# Patient Record
Sex: FEMALE | Race: WHITE | NOT HISPANIC OR LATINO | Employment: UNEMPLOYED | URBAN - METROPOLITAN AREA
[De-identification: names, ages, dates, MRNs, and addresses within clinical notes are randomized per-mention and may not be internally consistent; named-entity substitution may affect disease eponyms.]

---

## 2020-04-06 ENCOUNTER — OFFICE VISIT (OUTPATIENT)
Dept: FAMILY MEDICINE | Facility: CLINIC | Age: 25
End: 2020-04-06

## 2020-04-06 VITALS
SYSTOLIC BLOOD PRESSURE: 114 MMHG | DIASTOLIC BLOOD PRESSURE: 72 MMHG | WEIGHT: 112.4 LBS | RESPIRATION RATE: 16 BRPM | HEIGHT: 66 IN | BODY MASS INDEX: 18.06 KG/M2

## 2020-04-06 DIAGNOSIS — R63.4 WEIGHT LOSS: ICD-10-CM

## 2020-04-06 DIAGNOSIS — Z00.00 ROUTINE GENERAL MEDICAL EXAMINATION AT A HEALTH CARE FACILITY: Primary | ICD-10-CM

## 2020-04-06 DIAGNOSIS — Z13.0 SCREENING FOR DEFICIENCY ANEMIA: ICD-10-CM

## 2020-04-06 DIAGNOSIS — Z13.228 SCREENING FOR METABOLIC DISORDER: ICD-10-CM

## 2020-04-06 LAB
% GRANULOCYTES: 65.6 % (ref 42.2–75.2)
HCT VFR BLD AUTO: 39.5 % (ref 35–46)
HEMOGLOBIN: 13.5 G/DL (ref 11.8–15.5)
LYMPHOCYTES NFR BLD AUTO: 27 % (ref 20.5–51.1)
MCH RBC QN AUTO: 28.6 PG (ref 27–31)
MCHC RBC AUTO-ENTMCNC: 34.2 G/DL (ref 33–37)
MCV RBC AUTO: 83.4 FL (ref 80–100)
MONOCYTES NFR BLD AUTO: 7.4 % (ref 1.7–9.3)
PLATELET # BLD AUTO: 308 K/UL (ref 140–450)
RBC # BLD AUTO: 4.73 X10/CMM (ref 3.7–5.2)
WBC # BLD AUTO: 5 X10/CMM (ref 3.8–11)

## 2020-04-06 PROCEDURE — 84443 ASSAY THYROID STIM HORMONE: CPT | Mod: 90 | Performed by: NURSE PRACTITIONER

## 2020-04-06 PROCEDURE — 85025 COMPLETE CBC W/AUTO DIFF WBC: CPT | Performed by: NURSE PRACTITIONER

## 2020-04-06 PROCEDURE — 36415 COLL VENOUS BLD VENIPUNCTURE: CPT | Performed by: NURSE PRACTITIONER

## 2020-04-06 PROCEDURE — 80053 COMPREHEN METABOLIC PANEL: CPT | Mod: 90 | Performed by: NURSE PRACTITIONER

## 2020-04-06 PROCEDURE — 83036 HEMOGLOBIN GLYCOSYLATED A1C: CPT | Mod: 90 | Performed by: NURSE PRACTITIONER

## 2020-04-06 PROCEDURE — 80061 LIPID PANEL: CPT | Mod: 90 | Performed by: NURSE PRACTITIONER

## 2020-04-06 PROCEDURE — 90714 TD VACC NO PRESV 7 YRS+ IM: CPT | Performed by: NURSE PRACTITIONER

## 2020-04-06 PROCEDURE — 90651 9VHPV VACCINE 2/3 DOSE IM: CPT | Performed by: NURSE PRACTITIONER

## 2020-04-06 PROCEDURE — 90472 IMMUNIZATION ADMIN EACH ADD: CPT | Performed by: NURSE PRACTITIONER

## 2020-04-06 PROCEDURE — 99395 PREV VISIT EST AGE 18-39: CPT | Mod: 25 | Performed by: NURSE PRACTITIONER

## 2020-04-06 PROCEDURE — 90471 IMMUNIZATION ADMIN: CPT | Performed by: NURSE PRACTITIONER

## 2020-04-06 ASSESSMENT — ANXIETY QUESTIONNAIRES
GAD7 TOTAL SCORE: 7
1. FEELING NERVOUS, ANXIOUS, OR ON EDGE: SEVERAL DAYS
3. WORRYING TOO MUCH ABOUT DIFFERENT THINGS: MORE THAN HALF THE DAYS
2. NOT BEING ABLE TO STOP OR CONTROL WORRYING: MORE THAN HALF THE DAYS
5. BEING SO RESTLESS THAT IT IS HARD TO SIT STILL: SEVERAL DAYS
IF YOU CHECKED OFF ANY PROBLEMS ON THIS QUESTIONNAIRE, HOW DIFFICULT HAVE THESE PROBLEMS MADE IT FOR YOU TO DO YOUR WORK, TAKE CARE OF THINGS AT HOME, OR GET ALONG WITH OTHER PEOPLE: SOMEWHAT DIFFICULT
6. BECOMING EASILY ANNOYED OR IRRITABLE: SEVERAL DAYS
7. FEELING AFRAID AS IF SOMETHING AWFUL MIGHT HAPPEN: NOT AT ALL

## 2020-04-06 ASSESSMENT — MIFFLIN-ST. JEOR: SCORE: 1286.59

## 2020-04-06 ASSESSMENT — PATIENT HEALTH QUESTIONNAIRE - PHQ9
5. POOR APPETITE OR OVEREATING: NOT AT ALL
SUM OF ALL RESPONSES TO PHQ QUESTIONS 1-9: 6

## 2020-04-06 NOTE — PATIENT INSTRUCTIONS
Preventive Health Recommendations  Female Ages 21 to 25     Yearly exam:     See your health care provider every year in order to  o Review health changes.   o Discuss preventive care.    o Review your medicines if your doctor has prescribed any.      You should be tested each year for STDs (sexually transmitted diseases).       Talk to your provider about how often you should have cholesterol testing.      Get a Pap test every three years. If you have an abnormal result, your doctor may have you test more often.      If you are at risk for diabetes, you should have a diabetes test (fasting glucose).     Shots:     Get a flu shot each year.     Get a tetanus shot every 10 years.     Consider getting the shot (vaccine) that prevents cervical cancer (Gardasil).    Nutrition:     Eat at least 5 servings of fruits and vegetables each day.    Eat whole-grain bread, whole-wheat pasta and brown rice instead of white grains and rice.    Get adequate Calcium and Vitamin D.     Lifestyle    Exercise at least 150 minutes a week each week (30 minutes a day, 5 days a week). This will help you control your weight and prevent disease.    Limit alcohol to one drink per day.    No smoking.     Wear sunscreen to prevent skin cancer.    See your dentist every six months for an exam and cleaning.      Please get outside and exercise, plan time with family and friends, get some kind of game and play it with your roommates, see if anyone wants to do living-room yoga with you. Most importly talk to your friends and family about how you are feeling.     Increase calories  Patient Education   Tips to Increase the Calories in Your Diet  You may need more calories in your diet to help you heal from an illness, surgery or wound. Extra calories can also help you gain weight. Here are some ideas for adding high-calorie foods to your meals.  Butter, margarine and oil    Add to cream soups, sauces and gravies.    Use in hot cereals, rice,  noodles, potatoes and cooked vegetables.    Mix with herbs and seasonings, then add to cooked meat, hamburger, fish and egg dishes.    Melt and use as a dip for breads and seafood (shrimp, scallops, crab and lobster).    Spread butter or margarine on bread, then add sandwich spread or peanut butter.  Whipped cream, half-and-half and whole milk    Mix into cereals, mashed potatoes, pasta, rice and egg dishes.    Use to make sauces, cream soups, batters, puddings, custards, milk shakes and hot chocolate (with marshmallows).    Use sweetened whipped cream on hot chocolate, gelatin desserts, fruits, puddings, pancakes and waffles.    Mix unsweetened whipped cream with mashed potatoes or vegetable purees.  Cream cheese    Spread on muffins, bagels, breads, crackers and fruit slices.    Roll into balls and coat with granola, wheat germ or chopped nuts.    Mix with vegetables.  Sour cream    Use as a topping for baked potatoes, muffins, cakes, fruits, breads and gelatin desserts.    Add to soups, chili, macaroni and cheese, potatoes and vegetables.    Use as a dip for fresh fruits and vegetables.  Salad dressings and mayonnaise    Add to fish, meats, vegetable salads, egg salads and baked potatoes.    Spread on crackers or sandwiches.    Use as a dip for vegetables, pizza crusts, breads and chicken fingers.  Honey, jam, syrup and sugar    Add to breads, cereals, milk drinks, ice cream, yogurt and fruit desserts.    Use as a glaze for meats.  Granola and wheat germ    Mix with dried fruits and nuts for a snack.    Add to vegetables, yogurt, ice cream, fruits, cereals, custards and puddings.    Use in pudding recipes in place of rice or bread.    Use in breads, muffins and cookie batter.  Dried fruits  Note: Avoid in children under age 3.    Bake in turnovers and pies.    Mix with granola or nuts for a snack.    Add to stuffing, cookies, muffins, breads, cakes, rice and grain dishes, puddings and cereals.    Combine with  cooked vegetables such as yams, sweet potatoes, carrots and squash.  Nuts and seeds  Note: Avoid in children under age 3.    Use in casseroles, breads, muffins, pancakes, cookies and waffles.    Sprinkle on fruits, cereals, ice cream, yogurt, vegetables and salads.    Mix with raisins, dried fruits and chocolate chips for a snack.  Children under age 3 should avoid seeds, nuts, nut butters and hard pieces of fruit.  Nut butters  Note: Avoid in children under age 3.    Spread on sandwiches, toast, muffins, crackers, waffles, pancakes and fruit slices.    Use as a dip for raw vegetables.    Blend with milk shakes and nutrition drinks.    Swirl through ice cream or yogurt.    Mix into hot cereals.  Ice cream and frozen yogurt    Add to carbonated (fizzy) drinks such as ginger ale or root beer.    Blend with milk drinks for a shake or fruits for a smoothie.    Add to cereals, fruits, gelatin desserts and pies.    [place] between cookies, gina crackers or cake slices.    Scoop on top of waffles or pancakes.  Nutrition supplements (nutrition bars, drinks and powders)    Add powders to milk drinks and desserts.    Mix with ice cream, milk and fruit for a high-protein milk shake.    For informational purposes only. Not to replace the advice of your health care provider.  Copyright   2005 [facility]. All rights reserved. [employer] 616007 - REV 01/16.

## 2020-04-06 NOTE — PROGRESS NOTES
"   SUBJECTIVE:   CC: Christa Caceres is an 24 year old woman who presents for preventive health visit.     Reports a weight loss of 15-20 pounds, despite eating the usual amount of calories. Not exercising, reports she did not notice the weight loss until her family mentioned it over joyce. Family history (materanl aunt) of type one diabetes.    Usually eats pizza, cheese and crackers and pepperoni, oatmeal, eggs, burritos, chips, soup.   Wakes up feeling sick nausea without vomiting. This happens a few times a month. Occasionally feels faint, no fainting.     Started a new job and has been feeling depressed isolated and anxious.     Right foot feels \"weird\" and tingly, no \"pins and needels\" but \"like a pinched nerve\".     Cycle is 28-30 days lasts for 6-7 day sometimes very heavy bleeding. A tampon every hour occasionally. LMP: 03/10/2020. No recent changes to cycle. Has never been sexually active.     Healthy Habits:    Do you get at least three servings of calcium containing foods daily (dairy, green leafy vegetables, etc.)? yes    Amount of exercise or daily activities, outside of work: 1 day(s) per week    Problems taking medications regularly not applicable    Medication side effects: No    Have you had an eye exam in the past two years? no    Do you see a dentist twice per year? no    Do you have sleep apnea, excessive snoring or daytime drowsiness?no    Today's PHQ-2 Score:   PHQ-2 ( 1999 Pfizer) 4/6/2020   Q1: Little interest or pleasure in doing things 0   Q2: Feeling down, depressed or hopeless 1   PHQ-2 Score 1     Abuse: Current or Past(Physical, Sexual or Emotional)- No  Do you feel safe in your environment? Yes    Social History     Tobacco Use     Smoking status: Never Smoker     Smokeless tobacco: Never Used   Substance Use Topics     Alcohol use: No     If you drink alcohol do you typically have >3 drinks per day or >7 drinks per week? No                     Reviewed orders with patient.  " "Reviewed health maintenance and updated orders accordingly - Yes  Lab work is in process  Labs reviewed in EPIC  BP Readings from Last 3 Encounters:   04/06/20 114/72   08/14/13 102/68 (21 %/ 57 %)*   08/10/12 (!) 86/56 (<1 %/ 15 %)*     *BP percentiles are based on the 2017 AAP Clinical Practice Guideline for girls    Wt Readings from Last 3 Encounters:   04/06/20 51 kg (112 lb 6.4 oz)   08/14/13 53.6 kg (118 lb 3.2 oz) (48 %)*   08/10/12 52.4 kg (115 lb 9.6 oz) (51 %)*     * Growth percentiles are based on [facility][note 3] (Girls, 2-20 Years) data.          Mammogram not appropriate for this patient based on age.    Pertinent mammograms are reviewed under the imaging tab.  History of abnormal Pap smear: Not sexually active.      Reviewed and updated as needed this visit by clinical staff  Tobacco  Allergies  Meds       Reviewed and updated as needed this visit by Provider        ROS:  CONSTITUTIONAL: NEGATIVE for fever, chills, change in weight  INTEGUMENTARY/SKIN: NEGATIVE for worrisome rashes, moles or lesions  EYES: NEGATIVE for vision changes or irritation  ENT: NEGATIVE for ear, mouth and throat problems  RESP: NEGATIVE for significant cough or SOB  BREAST: NEGATIVE for masses, tenderness or discharge  CV: NEGATIVE for chest pain, palpitations or peripheral edema  GI: NEGATIVE for nausea, abdominal pain, heartburn, or change in bowel habits  : NEGATIVE for unusual urinary or vaginal symptoms. No vaginal bleeding.  MUSCULOSKELETAL: NEGATIVE for significant arthralgias or myalgia  NEURO: NEGATIVE for weakness, dizziness or paresthesias  ENDOCRINE: NEGATIVE for temperature intolerance, skin/hair changes  HEME/ALLERGY/IMMUNE: NEGATIVE for bleeding problems  PSYCHIATRIC: NEGATIVE for changes in mood or affect     OBJECTIVE:   /72   Resp 16   Ht 1.676 m (5' 6\")   Wt 51 kg (112 lb 6.4 oz)   LMP 03/10/2020   BMI 18.14 kg/m    EXAM:  GENERAL: healthy, alert and no distress  EYES: Eyes grossly normal to inspection, " PERRL and conjunctivae and sclerae normal  HENT: ear canals and TM's normal, nose and mouth without ulcers or lesions  NECK: no adenopathy, no asymmetry, masses, or scars and thyroid normal to palpation  RESP: lungs clear to auscultation - no rales, rhonchi or wheezes  BREAST: normal without masses, tenderness or nipple discharge and no palpable axillary masses or adenopathy  CV: regular rate and rhythm, normal S1 S2, no S3 or S4, no murmur, click or rub, no peripheral edema and peripheral pulses strong  ABDOMEN: soft, nontender, no hepatosplenomegaly, no masses and bowel sounds normal  MS: no gross musculoskeletal defects noted, no edema  SKIN: no suspicious lesions or rashes  NEURO: Normal strength and tone, mentation intact and speech normal  PSYCH: mentation appears normal, affect normal/bright    Diagnostic Test Results:  Labs reviewed in Epic    ASSESSMENT/PLAN:   Christa was seen today for physical, weight loss and nausea.    Diagnoses and all orders for this visit:    Routine general medical examination at a health care facility    Weight loss  -     Lipid Panel ([facility])  -     Comp. Metabolic Panel (14) ([facility])  -     Hemoglobin A1C ([facility])  I would like you to increase calories. I have given you written instruction on ways to increase your calorie intake daily. Please increase activity by walking or doing yoga in your apartment.     I would like you to continue to journal about your mood and we will check in four weeks from now. If you are still feeling depressed I would like you to consider taking an antidepressant to help improve your mood.     Screening for metabolic disorder  -     Comp. Metabolic Panel (14) ([facility])  -     Hemoglobin A1C ([facility])  -     TSH ([facility])  We have taken labs which will tell us how your blood sugar levels have been. I will call with any abnormal labs. I would like you to follow up here in four weeks. I am concerned your unintended weight loss.    Screening for  "deficiency anemia  -     CBC with Diff/Plt ([facility])  I would like to screen for anemia based on your report of increased heavy menstrual bleeding. I will call if any of your lab tests are abnormal.     Other orders  -     TD PRESERV FREE, IM (7+ YRS)  -     ADMIN 1st VACCINE  -     HPV, IM (9 - 26 YRS) - Gardasil 9  -     EA ADD'L VACCINE        COUNSELING:   Reviewed preventive health counseling, as reflected in patient instructions  Special attention given to:        Regular exercise       Healthy diet/nutrition       Vision screening    Estimated body mass index is 18.14 kg/m  as calculated from the following:    Height as of this encounter: 1.676 m (5' 6\").    Weight as of this encounter: 51 kg (112 lb 6.4 oz).    Weight management plan return visit in 1 Month     reports that she has never smoked. She has never used smokeless tobacco.    Counseling Resources:  ATP IV Guidelines  Pooled Cohorts Equation Calculator  Breast Cancer Risk Calculator  FRAX Risk Assessment  ICSI Preventive Guidelines  Dietary Guidelines for Americans, 2010  USDA's MyPlate  ASA Prophylaxis  Lung CA Screening    Dasia Buckley NP  [facility]  "

## 2020-04-07 LAB
ALBUMIN SERPL-MCNC: 4.3 G/DL (ref 3.9–5)
ALBUMIN/GLOB SERPL: 1.9 {RATIO} (ref 1.2–2.2)
ALP SERPL-CCNC: 53 IU/L (ref 39–117)
ALT SERPL-CCNC: 12 IU/L (ref 0–32)
AST SERPL-CCNC: 12 IU/L (ref 0–40)
BILIRUB SERPL-MCNC: 0.3 MG/DL (ref 0–1.2)
BUN SERPL-MCNC: 8 MG/DL (ref 6–20)
BUN/CREATININE RATIO: 12 (ref 9–23)
CALCIUM SERPL-MCNC: 9.5 MG/DL (ref 8.7–10.2)
CHLORIDE SERPLBLD-SCNC: 105 MMOL/L (ref 96–106)
CHOLEST SERPL-MCNC: 149 MG/DL (ref 100–199)
CREAT SERPL-MCNC: 0.67 MG/DL (ref 0.57–1)
EGFR IF AFRICN AM: 144 ML/MIN/1.73
EGFR IF NONAFRICN AM: 125 ML/MIN/1.73
GLOBULIN, TOTAL: 2.3 G/DL (ref 1.5–4.5)
GLUCOSE SERPL-MCNC: 85 MG/DL (ref 65–99)
HBA1C MFR BLD: 5.6 % (ref 4.8–5.6)
HDLC SERPL-MCNC: 67 MG/DL
LDL/HDL RATIO: 1.1 RATIO (ref 0–3.2)
LDLC SERPL CALC-MCNC: 71 MG/DL (ref 0–99)
POTASSIUM SERPL-SCNC: 4.2 MMOL/L (ref 3.5–5.2)
PROT SERPL-MCNC: 6.6 G/DL (ref 6–8.5)
SODIUM SERPL-SCNC: 140 MMOL/L (ref 134–144)
TOTAL CO2: 26 MMOL/L (ref 20–29)
TRIGL SERPL-MCNC: 56 MG/DL (ref 0–149)
TSH BLD-ACNC: 1.93 UIU/ML (ref 0.45–4.5)
VLDLC SERPL CALC-MCNC: 11 MG/DL (ref 5–40)

## 2020-04-07 ASSESSMENT — ANXIETY QUESTIONNAIRES: GAD7 TOTAL SCORE: 7

## 2020-05-04 ENCOUNTER — OFFICE VISIT (OUTPATIENT)
Dept: FAMILY MEDICINE | Facility: CLINIC | Age: 25
End: 2020-05-04

## 2020-05-04 VITALS
OXYGEN SATURATION: 98 % | BODY MASS INDEX: 18.21 KG/M2 | SYSTOLIC BLOOD PRESSURE: 110 MMHG | DIASTOLIC BLOOD PRESSURE: 70 MMHG | TEMPERATURE: 98.6 F | WEIGHT: 112.8 LBS | HEART RATE: 68 BPM | RESPIRATION RATE: 16 BRPM

## 2020-05-04 DIAGNOSIS — R63.4 UNINTENDED WEIGHT LOSS: Primary | ICD-10-CM

## 2020-05-04 LAB — ERYTHROCYTE [SEDIMENTATION RATE] IN BLOOD: 0 MM/HR (ref 0–20)

## 2020-05-04 PROCEDURE — 99213 OFFICE O/P EST LOW 20 MIN: CPT | Performed by: NURSE PRACTITIONER

## 2020-05-04 PROCEDURE — 82306 VITAMIN D 25 HYDROXY: CPT | Mod: 90 | Performed by: NURSE PRACTITIONER

## 2020-05-04 PROCEDURE — 86140 C-REACTIVE PROTEIN: CPT | Mod: 90 | Performed by: NURSE PRACTITIONER

## 2020-05-04 PROCEDURE — 36415 COLL VENOUS BLD VENIPUNCTURE: CPT | Performed by: NURSE PRACTITIONER

## 2020-05-04 PROCEDURE — 83516 IMMUNOASSAY NONANTIBODY: CPT | Mod: 90 | Performed by: NURSE PRACTITIONER

## 2020-05-04 PROCEDURE — 85651 RBC SED RATE NONAUTOMATED: CPT | Performed by: NURSE PRACTITIONER

## 2020-05-04 ASSESSMENT — ANXIETY QUESTIONNAIRES
7. FEELING AFRAID AS IF SOMETHING AWFUL MIGHT HAPPEN: NOT AT ALL
5. BEING SO RESTLESS THAT IT IS HARD TO SIT STILL: SEVERAL DAYS
6. BECOMING EASILY ANNOYED OR IRRITABLE: SEVERAL DAYS
2. NOT BEING ABLE TO STOP OR CONTROL WORRYING: SEVERAL DAYS
IF YOU CHECKED OFF ANY PROBLEMS ON THIS QUESTIONNAIRE, HOW DIFFICULT HAVE THESE PROBLEMS MADE IT FOR YOU TO DO YOUR WORK, TAKE CARE OF THINGS AT HOME, OR GET ALONG WITH OTHER PEOPLE: SOMEWHAT DIFFICULT
1. FEELING NERVOUS, ANXIOUS, OR ON EDGE: SEVERAL DAYS
3. WORRYING TOO MUCH ABOUT DIFFERENT THINGS: SEVERAL DAYS
GAD7 TOTAL SCORE: 5

## 2020-05-04 ASSESSMENT — PATIENT HEALTH QUESTIONNAIRE - PHQ9
SUM OF ALL RESPONSES TO PHQ QUESTIONS 1-9: 5
5. POOR APPETITE OR OVEREATING: NOT AT ALL

## 2020-05-04 NOTE — PATIENT INSTRUCTIONS
Labs today, stool testing for H pylori  Start seeing your therapist and let us know how this goes  Keep track of your calories  Exercise should help with your well being as well, continue this!    Recheck in 6-8 weeks with Taina or Dasia Nguyen at some point this year when you are ready!

## 2020-05-04 NOTE — PROGRESS NOTES
Problem(s) Oriented visit        SUBJECTIVE:                                                    Christa Caceres is a 24 year old female who presents to clinic today for the following health issues:    Unintended weight loss follow up: 5/2019 weighed 130lb, 5lb lighter at the end of the summer, 5lb lighter at [place], and now 5lb lighter since then. Was seen about a month ago and weight was 51kg, now 51.2kg. Has been monitoring her caloric intake and 24h diet recall is as follows:   24 hour diet:  Coffee and two small bran muffins with butter  Egg and a piece of toast, water, Jona   Goldfish crackers handful  Oatmeal chocolate chip cookie and glass of milk  Burrito and a whole avocado, chips, sprite  Water throughout the day    She is unsure why she has been losing weight. States that she feels comfortable with her body and weight, adamantly denies intentionally restricting calories, increasing her exercise (though she does exercise), or intentionally vomiting after eating. Does note that she has to gag and vomits after eating about once per month, but also admits that this can be brought on by emotional stressors as well. She can pinpoint when this all started- was on a road trip to [place] last spring with her friends and had an emotionally difficult conversation with one of her friends. She is unsure if this was due to eating something strange on the road trip or the conversation.    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 She saw Dasia Buckley CNP, last month and discussed possibly being depressed over the winter. She will be seeing a therapist starting this week. No SI/HI. No smoking or illicit drugs, drinks about one drink per week.     Denies fevers, chills, lymphadenopathy, SOB/MENCHACA, cough, dysphagia, chest pain, abdominal pain, heartburn, polyuria, pelvic pain, problems with bowels, feeling excessively hot or  cold, weakness, muscle pain, hair loss, joint pain, abnormal rashes. Continues with occasionally heavy periods but periods are regular. She reports her mother is more concerned about this than she is and her mother is concerned about lupus.      Problem list, Medication list, Allergies, and Medical/Social/Surgical histories reviewed in [facility] and updated as appropriate.   Additional history: as documented    ROS:  10 point ROS completed and negative except noted above, including Gen, CV, Resp, GI, , MS, Endo, Neurologic, Psych    Histories:   There is no problem list on file for this patient.    No past surgical history on file.    Social History     Tobacco Use     Smoking status: Never Smoker     Smokeless tobacco: Never Used   Substance Use Topics     Alcohol use: No     No family history on file.        OBJECTIVE:                                                    Physical Exam:    /70   Pulse 68   Temp 98.6  F (37  C)   Resp 16   Wt 51.2 kg (112 lb 12.8 oz)   SpO2 98%   BMI 18.21 kg/m      CONSTITUTIONAL: Alert non-toxic appearing female in no acute distress- thin but not cachetic  HEAD: Normocephalic, atraumatic  EYES: PERRLA; no scleral icterus; sclera without injection  NECK: Neck supple without lymphadenopathy, thyroid without enlargement or nodularity  RESPIRATORY: Lungs clear to auscultation, respirations unlabored  CV: Regular rate and rhythm, S1S2, no clicks, murmurs, rubs, or gallops; bilateral lower extremities without edema, dorsalis pedis pulses 2+ bilaterally  GASTROINTESTINAL: Normoactive bowel sounds x4 quadrants, abdomen soft, non-distended, and non-tender to palpation without masses or organomegaly  LYMPHATIC: Cervical, supraclavicular, and inguinal nodes without lymphadenopathy  MUSCULOSKELETAL: Moves all extremities, no obvious muscle wasting  NEUROLOGIC: No gross deficits, normal gait  SKIN: Appropriate color for race, warm and dry, no rashes or lesions to unclothed  skin  PSYCHIATRIC: Pleasant and interactive, affect bright, makes appropriate eye contact, thought process linear       ASSESSMENT/PLAN:                                                        Christa was seen today for follow up.    Diagnoses and all orders for this visit:    Unintended weight loss  -     t-Transglutaminase (tTG) IgA ([facility])  -     Cancel: H. Pylori Stool Ag EIA ([facility])  -     Vitamin D  25-Hydroxy ([facility])  -     Sed Rate Westergren ([facility])  -     C-Reactive Protein  Quant ([facility])  -     H. Pylori Stool Ag EIA ([facility]); Future      Weight has been stable the past month, commended on this. Encouraged her to continue to keep a log of her intake to ensure adequate calories. Given the occasional vomiting after eating that began while on a trip and sharing food with her friends, will obtain H Pylori testing. Given vomiting with weight loss, will obtain tTg to eval for Celiac. Discussed that lupus is unlikely given her symptoms and her mother's concern, but will obtain inflammatory markers and if abnormal, will refer to rheumatology. Commended on making appointment with a therapist and encouraged her to keep this appointment- we discussed that there could be a trauma component related to this as it did start when she was having a difficult conversation with her friend.    Due for a Pap given that she is 23- she does not feel prepared for this today, to return for Pap. Follow up in 6-8 weeks with Taina or Dasia.    INSTRUCTIONS:  Labs today, stool testing for H pylori  Start seeing your therapist and let us know how this goes  Keep track of your calories  Exercise should help with your well being as well, continue this!    Recheck in 6-8 weeks with Taina or Dasia    Pap at some point this year when you are ready!  The following health maintenance items are reviewed in Epic and correct as of today:  Health Maintenance   Topic Date Due     HIV SCREENING  07/20/2012     PAP  07/20/2018     HPV  IMMUNIZATION (2 - 3-dose series) 05/04/2020     INFLUENZA VACCINE (Season Ended) 09/01/2020     PREVENTIVE CARE VISIT  04/06/2021     DTAP/TDAP/TD IMMUNIZATION (8 - Td) 04/06/2030     PHQ-2  Completed     IPV IMMUNIZATION  Completed     MENINGITIS IMMUNIZATION  Aged Out       BOWEN Sims CNP  [facility]  Family Practice  [facility]  [phone]    For any issues my office # is [phone]

## 2020-05-05 ASSESSMENT — ANXIETY QUESTIONNAIRES: GAD7 TOTAL SCORE: 5

## 2020-05-08 DIAGNOSIS — R63.4 UNINTENDED WEIGHT LOSS: ICD-10-CM

## 2020-05-08 PROCEDURE — 87338 HPYLORI STOOL AG IA: CPT

## 2020-05-08 NOTE — NURSING NOTE
Future order released today. Patient dropped off stool sample.  Consuelo Boyce, [facility]  May 8, 2020

## 2020-05-10 LAB
CRP SERPL-MCNC: <1 MG/L (ref 0–10)
TISSUE TRANSGLUTAMINASE ABY IGA: <2 U/ML (ref 0–3)
VITAMIN D, 25-HYDROXY: 49.7 NG/ML (ref 30–100)

## 2020-05-12 LAB — H PYLORI AG STL QL: NEGATIVE

## 2021-01-15 ENCOUNTER — HEALTH MAINTENANCE LETTER (OUTPATIENT)
Age: 26
End: 2021-01-15

## 2021-05-15 ENCOUNTER — HEALTH MAINTENANCE LETTER (OUTPATIENT)
Age: 26
End: 2021-05-15

## 2021-08-18 ENCOUNTER — OFFICE VISIT (OUTPATIENT)
Dept: FAMILY MEDICINE | Facility: CLINIC | Age: 26
End: 2021-08-18

## 2021-08-18 VITALS
OXYGEN SATURATION: 99 % | SYSTOLIC BLOOD PRESSURE: 122 MMHG | RESPIRATION RATE: 18 BRPM | DIASTOLIC BLOOD PRESSURE: 78 MMHG | BODY MASS INDEX: 18.99 KG/M2 | WEIGHT: 114 LBS | HEIGHT: 65 IN | TEMPERATURE: 98.4 F | HEART RATE: 65 BPM

## 2021-08-18 DIAGNOSIS — L98.9 FACIAL SKIN LESION: Primary | ICD-10-CM

## 2021-08-18 PROCEDURE — 99213 OFFICE O/P EST LOW 20 MIN: CPT | Performed by: NURSE PRACTITIONER

## 2021-08-18 RX ORDER — METRONIDAZOLE 7.5 MG/G
GEL TOPICAL 2 TIMES DAILY
Qty: 45 G | Refills: 3 | Status: SHIPPED | OUTPATIENT
Start: 2021-08-18 | End: 2023-01-24

## 2021-08-18 ASSESSMENT — MIFFLIN-ST. JEOR: SCORE: 1271.94

## 2021-08-18 NOTE — PROGRESS NOTES
"Problem(s) Oriented visit        SUBJECTIVE:                                                    Christa Caceres is a 26 year old female who presents to clinic today for the following health issues :    Christa notes a red spot on her nose that had been present in her younger years, but resolved. Popped up again about three months ago and has persisted. Slightly raised, but not tender or pruritic, not growing over time. Does have some other small red spots on her finger and knees. Notes her mom has a salmon patch and cherry angiomas. Christa does flush easily when drinking alcohol and blushes easily as well. Not many issues with acne. Does have some ocular irritation at times from allergies.    Problem list, Medication list, Allergies, and Medical/Social/Surgical histories reviewed in EPIC and updated as appropriate.   Additional history: as documented    ROS:  Gen, skin negative except as listed per HPI      OBJECTIVE:                                                    Physical Exam:    /78   Pulse 65   Temp 98.4  F (36.9  C) (Temporal)   Resp 18   Ht 1.657 m (5' 5.25\")   Wt 51.7 kg (114 lb)   SpO2 99%   BMI 18.83 kg/m      CONSTITUTIONAL: Alert non-toxic appearing female in no acute distress  SKIN: Small 2-3mm raised erythematous blanching papule to the tip of the nose, appears vascular under dermatoscope. Faint telangiectasia to cheeks, few flesh color papules to cheeks. Sclera mildly injected bilaterally.  PSYCHIATRIC: Pleasant and interactive, affect euthymic, makes appropriate eye contact, thought process logical       ASSESSMENT/PLAN:                                                          Christa was seen today for derm problem.    Diagnoses and all orders for this visit:    Facial skin lesion  -     metroNIDAZOLE (METROGEL) 0.75 % external gel; Apply topically 2 times daily    Most consistent with a mild rosacea, but discussed that it could possibly be a cherry angioma vs telangiectasia. Will trial " metronidazole gel BID, reviewed skin cares and use of sunscreen. To see dermatology if symptoms worsen or fail to improve.            The following health maintenance items are reviewed in Epic and correct as of today:  Health Maintenance   Topic Date Due     ADVANCE CARE PLANNING  Never done     HIV SCREENING  Never done     HEPATITIS C SCREENING  Never done     PAP  Never done     HPV IMMUNIZATION (2 - 3-dose series) 05/04/2020     PREVENTIVE CARE VISIT  04/06/2021     INFLUENZA VACCINE (1) 09/01/2021     DTAP/TDAP/TD IMMUNIZATION (5 - Td or Tdap) 04/06/2030     PHQ-2  Completed     IPV IMMUNIZATION  Completed     HEPATITIS B IMMUNIZATION  Completed     COVID-19 Vaccine  Completed     Pneumococcal Vaccine: Pediatrics (0 to 5 Years) and At-Risk Patients (6 to 64 Years)  Aged Out     MENINGITIS IMMUNIZATION  Aged Out       Patient Instructions     Possible cherry angioma vs rosacea vs telangiectasia  Trial of metronidazole gel to the nose and cheeks twice daily for the next two weeks- if this helps, then it was likely rosacea and you would not need to see derm. You would take this with flares of the lesion. If it does not go away or worsens, please let me know and see dermatology.    Patient Education     Rosacea   Rosacea is a long-lasting (chronic) skin condition that often affects the face. In the early stages it causes easy flushing or blushing. Redness may become long-term (permanent) as the small blood vessels of the face widen (dilate). You may have small, red, pus-filled bumps (pustules). It looks like a bad case of acne, and has been called adult acne. But it's not caused by the same things that cause acne.  You will have flare-ups that come and go. They may happen every few weeks or every few months. Experts don't know what causes rosacea. If not treated, it tends to get worse over time.  Some older men often have a more severe form of rosacea (rhinophyma). The oil glands on the skin of the nose become  larger and the nose gets bigger. The cheeks also become puffy. Alcohol may make the flushing worse. But this condition is not caused by alcohol use.  Rosacea can be treated with gels and creams for the skin (topical). You may need antibiotic medicine by mouth for more severe cases. You should see improvement in the first 4 weeks. Dilated blood vessels can be treated with a small electric needle or laser surgery. Rhinophyma can be treated with surgery. Or it may be treated by surgically scraping the skin (dermabrasion).  Home care    Don't have foods or drinks that make your face red or flushed. These include hot drinks, spicy foods, caffeine, and alcohol.    Exercise indoors or in a cool area so you won't get overheated.    Limit your sun exposure. Use a sunscreen with at least SPF 30 on your face every day.    Stay out of extreme hot or extreme cold weather.    Don't scrub your face. That will irritate the skin and make the redness worse.    Don't use harsh soaps or moisturizers with irritating ingredients. You may need to use hypoallergenic cosmetics.    Don't use over-the-counter treatments unless your healthcare provider advises it. Some of these treatments may make rosacea worse.    Try to figure out what triggers your flares. This might be stress, sun exposure, or certain foods.    Follow-up care  Follow up with your healthcare provider, or as advised. Contact your provider if your condition does not get better after taking the medicines you were given. Getting treatment early can stop rosacea from getting worse.  When to seek medical advice  Call your healthcare provider right away if you have redness, burning, or a gritty feeling in your eyes.  [employer] last reviewed this educational content on 11/1/2019 2000-2021 [employer]. All rights reserved. This information is not intended as a substitute for professional medical care. Always follow your healthcare professional's instructions.          Patient Education     Treating Rosacea     Use sunscreen with at least SPF 15 or more every day.    There's no cure for rosacea. But rosacea can be controlled in most cases, particularly with medical treatment to manage your symptoms.   Your healthcare provider may prescribe one or more treatments to put on your skin each day. You may also be given medicines to take by mouth if you have more severe rosacea symptoms. To relieve rosacea eye symptoms, you may need prescription eye drops. Stay away from things that can easily cause your rosacea to flare up. These include spicy foods, alcohol, getting embarrassed, and going from a cold environment to a warm environment.    You may have surgery to fix the more severe forms of rosacea that affect the nose (rhinophyma). The term means the redness and swelling that cause the nose to enlarge.   Your role in medical treatment  How well your treatment works depends partly on you. Follow your healthcare provider s treatment plan. Rosacea symptoms often get better with medicines. But they tend to get worse again if you stop taking the medicines. If your symptoms continue or get worse, ask about other treatment options, including combinations of treatments.   Rosacea self-care  Besides sticking with your treatment plan, follow these tips to care for your skin:    Wash your face twice a day with a gentle facial cleanser. Rinse your skin well with warm (not hot) water. Pat your skin dry with a cotton towel.    Don t scrub your skin or use sponges, brushes, or other abrasive tools. Doing so can irritate your skin.    Don't use harsh scrubs or astringents. These products can irritate your skin.    If you shave your face, use an electric razor.    Apply sunscreen with at least SPF 15 or more every day. Sun exposure can make rosacea symptoms worse.    Choose skin care products and cosmetics that don't irritate the skin, and are oil-free and fragrance-free. If your rosacea tends to  include pimples, look for the word noncomedogenic on your products.    Don't put steroids on the skin sores. Steroids may make rosacea worse.   Next step  Learning about rosacea and treating it early is the first step toward controlling this disease. With proper treatment and self-care, you can manage your symptoms and feel better about your skin. The National Rosacea Society is a great resource for information.   What causes rosacea flare-ups?  It s often hard to pinpoint the factors that cause rosacea flare-ups. Different people can be affected by different triggers. Common triggers include weather extremes, sun exposure, alcoholic or hot beverages, spicy food, physical exertion, stress, illness, some skin products, and medicines. To prevent flare-ups, keep a list of things that seem to make your rosacea worse and try to stay away from them.   [employer] last reviewed this educational content on 8/1/2019 2000-2021 [employer]. All rights reserved. This information is not intended as a substitute for professional medical care. Always follow your healthcare professional's instructions.               BOWEN Sims CNP  [facility]  Family Practice  [facility]  [phone]    For any issues my office # is [phone]

## 2021-08-18 NOTE — PATIENT INSTRUCTIONS
Possible cherry angioma vs rosacea vs telangiectasia  Trial of metronidazole gel to the nose and cheeks twice daily for the next two weeks- if this helps, then it was likely rosacea and you would not need to see derm. You would take this with flares of the lesion. If it does not go away or worsens, please let me know and see dermatology.    Patient Education     Rosacea   Rosacea is a long-lasting (chronic) skin condition that often affects the face. In the early stages it causes easy flushing or blushing. Redness may become long-term (permanent) as the small blood vessels of the face widen (dilate). You may have small, red, pus-filled bumps (pustules). It looks like a bad case of acne, and has been called adult acne. But it's not caused by the same things that cause acne.  You will have flare-ups that come and go. They may happen every few weeks or every few months. Experts don't know what causes rosacea. If not treated, it tends to get worse over time.  Some older men often have a more severe form of rosacea (rhinophyma). The oil glands on the skin of the nose become larger and the nose gets bigger. The cheeks also become puffy. Alcohol may make the flushing worse. But this condition is not caused by alcohol use.  Rosacea can be treated with gels and creams for the skin (topical). You may need antibiotic medicine by mouth for more severe cases. You should see improvement in the first 4 weeks. Dilated blood vessels can be treated with a small electric needle or laser surgery. Rhinophyma can be treated with surgery. Or it may be treated by surgically scraping the skin (dermabrasion).  Home care    Don't have foods or drinks that make your face red or flushed. These include hot drinks, spicy foods, caffeine, and alcohol.    Exercise indoors or in a cool area so you won't get overheated.    Limit your sun exposure. Use a sunscreen with at least SPF 30 on your face every day.    Stay out of extreme hot or extreme cold  weather.    Don't scrub your face. That will irritate the skin and make the redness worse.    Don't use harsh soaps or moisturizers with irritating ingredients. You may need to use hypoallergenic cosmetics.    Don't use over-the-counter treatments unless your healthcare provider advises it. Some of these treatments may make rosacea worse.    Try to figure out what triggers your flares. This might be stress, sun exposure, or certain foods.    Follow-up care  Follow up with your healthcare provider, or as advised. Contact your provider if your condition does not get better after taking the medicines you were given. Getting treatment early can stop rosacea from getting worse.  When to seek medical advice  Call your healthcare provider right away if you have redness, burning, or a gritty feeling in your eyes.  [employer] last reviewed this educational content on 11/1/2019 2000-2021 [employer]. All rights reserved. This information is not intended as a substitute for professional medical care. Always follow your healthcare professional's instructions.         Patient Education     Treating Rosacea     Use sunscreen with at least SPF 15 or more every day.    There's no cure for rosacea. But rosacea can be controlled in most cases, particularly with medical treatment to manage your symptoms.   Your healthcare provider may prescribe one or more treatments to put on your skin each day. You may also be given medicines to take by mouth if you have more severe rosacea symptoms. To relieve rosacea eye symptoms, you may need prescription eye drops. Stay away from things that can easily cause your rosacea to flare up. These include spicy foods, alcohol, getting embarrassed, and going from a cold environment to a warm environment.    You may have surgery to fix the more severe forms of rosacea that affect the nose (rhinophyma). The term means the redness and swelling that cause the nose to enlarge.   Your role in  medical treatment  How well your treatment works depends partly on you. Follow your healthcare provider s treatment plan. Rosacea symptoms often get better with medicines. But they tend to get worse again if you stop taking the medicines. If your symptoms continue or get worse, ask about other treatment options, including combinations of treatments.   Rosacea self-care  Besides sticking with your treatment plan, follow these tips to care for your skin:    Wash your face twice a day with a gentle facial cleanser. Rinse your skin well with warm (not hot) water. Pat your skin dry with a cotton towel.    Don t scrub your skin or use sponges, brushes, or other abrasive tools. Doing so can irritate your skin.    Don't use harsh scrubs or astringents. These products can irritate your skin.    If you shave your face, use an electric razor.    Apply sunscreen with at least SPF 15 or more every day. Sun exposure can make rosacea symptoms worse.    Choose skin care products and cosmetics that don't irritate the skin, and are oil-free and fragrance-free. If your rosacea tends to include pimples, look for the word noncomedogenic on your products.    Don't put steroids on the skin sores. Steroids may make rosacea worse.   Next step  Learning about rosacea and treating it early is the first step toward controlling this disease. With proper treatment and self-care, you can manage your symptoms and feel better about your skin. The National Rosacea Society is a great resource for information.   What causes rosacea flare-ups?  It s often hard to pinpoint the factors that cause rosacea flare-ups. Different people can be affected by different triggers. Common triggers include weather extremes, sun exposure, alcoholic or hot beverages, spicy food, physical exertion, stress, illness, some skin products, and medicines. To prevent flare-ups, keep a list of things that seem to make your rosacea worse and try to stay away from them.    [employer] last reviewed this educational content on 8/1/2019 2000-2021 [employer]. All rights reserved. This information is not intended as a substitute for professional medical care. Always follow your healthcare professional's instructions.

## 2021-09-04 ENCOUNTER — HEALTH MAINTENANCE LETTER (OUTPATIENT)
Age: 26
End: 2021-09-04

## 2022-06-11 ENCOUNTER — HEALTH MAINTENANCE LETTER (OUTPATIENT)
Age: 27
End: 2022-06-11

## 2022-10-22 ENCOUNTER — HEALTH MAINTENANCE LETTER (OUTPATIENT)
Age: 27
End: 2022-10-22

## 2023-01-24 ENCOUNTER — OFFICE VISIT (OUTPATIENT)
Dept: FAMILY MEDICINE | Facility: CLINIC | Age: 28
End: 2023-01-24

## 2023-01-24 VITALS
DIASTOLIC BLOOD PRESSURE: 78 MMHG | WEIGHT: 115.6 LBS | BODY MASS INDEX: 18.58 KG/M2 | SYSTOLIC BLOOD PRESSURE: 120 MMHG | HEIGHT: 66 IN

## 2023-01-24 DIAGNOSIS — Z23 NEED FOR HPV VACCINE: ICD-10-CM

## 2023-01-24 DIAGNOSIS — Z83.49 FH: THYROID DISEASE: ICD-10-CM

## 2023-01-24 DIAGNOSIS — Z00.00 ROUTINE GENERAL MEDICAL EXAMINATION AT A HEALTH CARE FACILITY: Primary | ICD-10-CM

## 2023-01-24 DIAGNOSIS — N92.0 MENORRHAGIA WITH REGULAR CYCLE: ICD-10-CM

## 2023-01-24 LAB
% GRANULOCYTES: 62.6 % (ref 42.2–75.2)
HCT VFR BLD AUTO: 37 % (ref 35–46)
HEMOGLOBIN: 12.7 G/DL (ref 11.8–15.5)
LYMPHOCYTES NFR BLD AUTO: 30 % (ref 20.5–51.1)
MCH RBC QN AUTO: 28.2 PG (ref 27–31)
MCHC RBC AUTO-ENTMCNC: 34.3 G/DL (ref 33–37)
MCV RBC AUTO: 82.2 FL (ref 80–100)
MONOCYTES NFR BLD AUTO: 7.4 % (ref 1.7–9.3)
PLATELET # BLD AUTO: 327 K/UL (ref 140–450)
RBC # BLD AUTO: 4.5 X10/CMM (ref 3.7–5.2)
WBC # BLD AUTO: 5.7 X10/CMM (ref 3.8–11)

## 2023-01-24 PROCEDURE — 99213 OFFICE O/P EST LOW 20 MIN: CPT | Mod: 25 | Performed by: FAMILY MEDICINE

## 2023-01-24 PROCEDURE — 85025 COMPLETE CBC W/AUTO DIFF WBC: CPT | Performed by: FAMILY MEDICINE

## 2023-01-24 PROCEDURE — 90651 9VHPV VACCINE 2/3 DOSE IM: CPT | Performed by: FAMILY MEDICINE

## 2023-01-24 PROCEDURE — 36415 COLL VENOUS BLD VENIPUNCTURE: CPT | Performed by: FAMILY MEDICINE

## 2023-01-24 PROCEDURE — 99395 PREV VISIT EST AGE 18-39: CPT | Performed by: FAMILY MEDICINE

## 2023-01-24 PROCEDURE — 90471 IMMUNIZATION ADMIN: CPT | Mod: 59 | Performed by: FAMILY MEDICINE

## 2023-01-24 ASSESSMENT — ENCOUNTER SYMPTOMS
ARTHRALGIAS: 0
CONSTIPATION: 0
WEAKNESS: 1
ABDOMINAL PAIN: 0
JOINT SWELLING: 0
BREAST MASS: 0
EYE PAIN: 0
COUGH: 0
SORE THROAT: 0
SHORTNESS OF BREATH: 0
DIARRHEA: 0
DYSURIA: 0
PARESTHESIAS: 0
NAUSEA: 0
HEMATURIA: 0
NERVOUS/ANXIOUS: 0
CHILLS: 0
HEMATOCHEZIA: 0
MYALGIAS: 0
PALPITATIONS: 0
HEADACHES: 0
HEARTBURN: 0
DIZZINESS: 0
FREQUENCY: 0
FEVER: 0

## 2023-01-24 NOTE — PROGRESS NOTES
"Female Preventive Health Visit    SUBJECTIVE:    This 27 year old female presents for a routine preventive physical exam.    The patient has the following concerns:     1. FHx of thyroid disorder. Mother with hyperthyroidism. Requests screening.     2. Heavy periods. Bleeds for 7 days. Regular 28 day cycle.  Bleeding too heavy for menstrual cup. Now using menstrual disc.       OB/Gyn History:    Last Pap Smear: none prior. Not yet sexually active : prefers to postpone until then.   Current Contraceptive Method: none . Not currently sexually active (HIV and Hep C screening postponed until sexual debut)    Healthy Habits:     Getting at least 3 servings of Calcium per day:  Yes    Bi-annual eye exam:  NO    Dental care twice a year:  NO    Sleep apnea or symptoms of sleep apnea:  None    Diet:  Regular (no restrictions)    Frequency of exercise:  1 day/week    Duration of exercise:  15-30 minutes    Taking medications regularly:  Yes    Medication side effects:  Not applicable    PHQ-2 Total Score: 0    Additional concerns today:  Yes      OBJECTIVE:  Vitals:    01/24/23 1239   BP: 120/78   Weight: 52.4 kg (115 lb 9.6 oz)   Height: 1.676 m (5' 6\")    Body mass index is 18.66 kg/m .  General: no acute distress, cooperative with exam.  HEENT:  PERRLA. Bilateral TM's, external canals, oropharynx normal.    Neck:  Supple, no lymphadenopathy or thyromegaly   Lungs: clear to auscultation bilaterally, normal respiratory effort.  Heart:  RRR without murmurs, rubs or gallops.  Normal S1 and S2.  Abdomen: normal bowel sounds, nontender, no palpable organomegaly.   Skin:  No lesions.  No rashes.  Extremities: warm, perfused, no swelling or edema.  Neuro:  CN II-XII intact, motor & sensory function all intact.    Psych: mental status normal, mood and affect appropriate.      Today's PHQ-2 Score:   PHQ-2 ( 1999 [employer]) 1/24/2023   Q1: Little interest or pleasure in doing things 0   Q2: Feeling down, depressed or hopeless 0   PHQ-2 " Score 0   PHQ-2 Total Score (12-17 Years)- Positive if 3 or more points; Administer PHQ-A if positive -   Q1: Little interest or pleasure in doing things Not at all   Q2: Feeling down, depressed or hopeless Not at all   PHQ-2 Score 0     ASSESSMENT / PLAN:  This 27 year old female presents for a routine preventive physical exam. Preventive health topics discussed including adequate exercise and healthy diet. Return to clinic in one year for preventative exam or sooner with any other concerns.  Other issues discussed as noted below.      Routine general medical examination at a health care facility    FH: thyroid disease  -     TSH ([facility])    Need for HPV vaccine  2nd of 3 dose series issued today.   -     VACCINE ADMINISTRATION, INITIAL  -     LA HPV VAC 9V 3 DOSE IM    Menorrhagia with regular cycle  Screen for anemia. Discussed option of contraceptive start for management of heavy menses outside of effect of birth control.   -     CBC with Diff/Plt ([facility])

## 2023-01-25 LAB — TSH BLD-ACNC: 1.43 UIU/ML (ref 0.45–4.5)

## 2024-03-23 ENCOUNTER — HEALTH MAINTENANCE LETTER (OUTPATIENT)
Age: 29
End: 2024-03-23

## 2025-04-12 ENCOUNTER — HEALTH MAINTENANCE LETTER (OUTPATIENT)
Age: 30
End: 2025-04-12